# Patient Record
(demographics unavailable — no encounter records)

---

## 2024-12-11 NOTE — REASON FOR VISIT
[Routine Follow-Up] : a routine follow-up visit for [Exercise Induced Dyspnea] : exercise induced dyspnea [Mother] : mother

## 2024-12-12 NOTE — HISTORY OF PRESENT ILLNESS
[Shortness of Breath] : shortness of breath [0 x/month] : 0 x/month [< or = 2 days/wk] : < than or = 2 days/week [0 - 1/year] : 0 - 1/year [> or = 20] : > than or = 20 [Stable] : are stable [Nasal Passage Blockage (Stuffiness)] : nasal congestion [Cough] : coughing [Wheezing] : wheezing [Difficulty Breathing During Exertion] : dyspnea on exertion [Wheezing Only When Breathing In] : stridor [Nasal Discharge From Both Nostrils] : runny nose [Fever] : fever [Sweating Heavily At Night] : night sweats [Nonspecific Pain, Swelling, And Stiffness] : pain [Coughing Up Sputum] : sputum production [Coughing Up Blood (Hemoptysis)] : hemoptysis [Feelings Of Weakness On Exertion] : exercise intolerance [Snoring] : snoring [Exercise] : exercise [(# ___ in the past year)] : hospitalized [unfilled] times in the past year [( # ___ in the past year)] : intubated [unfilled] times in the past year [Minor Limitation] : minor limitation [FreeTextEntry1] : Exercise-induced bronchospasm, intermittent asthma, perennial allergic rhinitis  12/2024 visit. last seen 7/2024 Interval history - No recent exacerbations. Has had nasal congestion for the past few days. No difficulty breathing, minimal coughing. No fevers.  ER/hospitalizations since last visit - none  oral steroids since last visit - denies  cough/wheeze, SOB, night time awakening with cough/wheeze - denies  snoring - has been snoring recently with nasal congestion, does not occur most nights  allergy symptoms - +nasal congestion with dust, has tried children's Zyrtec with some improvement  last used rescue - prior to last appointment in summer    Meds: Albuterol pre-exercise - but has not needed since last visit.  Has not yet received flu vaccine this season   Initial Visit: 7/2024 This is a 7 year old female here for the evaluation of SOB with exertion and prolonged colds.   Age of onset of respiratory sx: toddler- had a nebulizer for colds, wheezing, and congestion. This winter she was waking up at night coughing (sometimes has post tussive emesis) that wakes her up with colds, but had no wheezing at this time. Dx with asthma/RAD: denies  Hospitalization/year: denies  ED visits/year: denies Steroid courses/year: denies  Last oral steroid course: denies  Typical sx: +cough, + shortness of breath, wheezed when she was younger, but never again Typical trigger: + URI/viral, + exercise, allergic trigger, +cold weather Time of year: +winter Response to albuterol: typically yes Response to oral steroids: denies Using spacer: No Spacer technique described as: Interval sx: + exercise intolerance, + nocturnal cough with colds daily cough Atopic sx: +eczema, no seasonal allergies, + environmental allergies (dust), no food allergies GI sx: no reflux sx ENT sx: no chronic congestion, + snoring in winter Family history: asthma atopy- mom, dad and maternal grandfather Current sx: denies    Meds: denies Vaccines: UTD Flu Vaccine: yes COVID Vaccine: denies    Modified asthma predictive index: Parental history of asthma: mom and dad Physician diagnosed atopic dermatitis: denies Environmental Allergies: severely allergic to dust diagnosed by allergist with skin test Peripheral eosinophilia: denies Food allergies: denies     [Oxygen] : the patient uses no supplemental oxygen [More Frequent Use Needed Recently] : Patient reports no recent increase in frequency of [de-identified] : Albuterol PRN

## 2024-12-12 NOTE — END OF VISIT
[FreeTextEntry3] : I, Saida Gong RN, have acted as a scribe and documented the HPI information for Dr. Multani. The HPI documentation completed by the scribe is an accurate record of both my words and actions.  [Time Spent: ___ minutes] : I have spent [unfilled] minutes of time on the encounter which excludes teaching and separately reported services.

## 2024-12-12 NOTE — PHYSICAL EXAM
[Well Nourished] : well nourished [Well Developed] : well developed [Alert] : ~L alert [Active] : active [Normal Breathing Pattern] : normal breathing pattern [No Respiratory Distress] : no respiratory distress [No Allergic Shiners] : no allergic shiners [No Drainage] : no drainage [No Conjunctivitis] : no conjunctivitis [No Nasal Drainage] : no nasal drainage [No Oral Pallor] : no oral pallor [No Oral Cyanosis] : no oral cyanosis [Non-Erythematous] : non-erythematous [No Exudates] : no exudates [No Postnasal Drip] : no postnasal drip [No Tonsillar Enlargement] : no tonsillar enlargement [Absence Of Retractions] : absence of retractions [Symmetric] : symmetric [Good Expansion] : good expansion [No Acc Muscle Use] : no accessory muscle use [Good aeration to bases] : good aeration to bases [Equal Breath Sounds] : equal breath sounds bilaterally [No Crackles] : no crackles [No Rhonchi] : no rhonchi [No Wheezing] : no wheezing [Normal Sinus Rhythm] : normal sinus rhythm [No Heart Murmur] : no heart murmur [Full ROM] : full range of motion [No Clubbing] : no clubbing [Capillary Refill < 2 secs] : capillary refill less than two seconds [No Cyanosis] : no cyanosis [No Petechiae] : no petechiae [Alert and  Oriented] : alert and oriented [No Rashes] : no rashes [No Skin Lesions] : no skin lesions [FreeTextEntry4] : nixon, nasal mucosa

## 2024-12-12 NOTE — REVIEW OF SYSTEMS
[NI] : Genitourinary  [Nl] : Integumentary [Shortness of Breath] : shortness of breath [Eczema] : eczema [Immunizations are up to date] : Immunizations are up to date [Nasal Congestion] : nasal congestion [Frequent URIs] : no frequent upper respiratory infections [Heart Disease] : no heart disease [Wheezing] : no wheezing [Cough] : no cough [Influenza Vaccine this Past Year] : no influenza vaccine this past year

## 2024-12-12 NOTE — CONSULT LETTER
[Dear  ___] : Dear  [unfilled], [Consult Letter:] : I had the pleasure of evaluating your patient, [unfilled]. [Please see my note below.] : Please see my note below. [Consult Closing:] : Thank you very much for allowing me to participate in the care of this patient.  If you have any questions, please do not hesitate to contact me. [Sincerely,] : Sincerely, [FreeTextEntry2] : Dr. Wilber Robert  [FreeTextEntry3] :  Kristen Multani MD Chief, Division of Pediatric Pulmonary and [facility] Center  of Pediatrics [facility] at [facility]

## 2024-12-12 NOTE — CONSULT LETTER
[Dear  ___] : Dear  [unfilled], [Consult Letter:] : I had the pleasure of evaluating your patient, [unfilled]. [Please see my note below.] : Please see my note below. [Consult Closing:] : Thank you very much for allowing me to participate in the care of this patient.  If you have any questions, please do not hesitate to contact me. [Sincerely,] : Sincerely, [FreeTextEntry2] : Dr. Wilber Robert  [FreeTextEntry3] :  Kristen Multani MD Chief, Division of Pediatric Pulmonary and CF Center  of Pediatrics [facility] at [facility]

## 2024-12-12 NOTE — ASSESSMENT
[FreeTextEntry1] : EXERCISE-INDUCED BRONCHOSPASM - Symptoms with exercise are suggestive of exercise induced bronchospasm and are overall improving - will continue to give Albuterol 15-20 min pre-exercise.   INTERMITTENT ASTHMA: Asthma is a clinical diagnosis based on the following predictive risk factors: history of physician diagnosis, dust mite allergy and maternal and paternal history of asthma with a response to bronchodilators. Patient's asthma triggered by activity, and URI. Normal Spirometry today.  ACT >20. Will hold off on maintenance ICS. Overall doing well with last PRN albuterol use prior to last visit in July 2024. History, exam, trajectory or course not supportive of alternative diagnoses such as CF, primary ciliary dyskinesia, immune deficiency, congenital airway anomalies such as airway malacia.  ALLERGIC RHINITIS - Patient has history of dust mite allergies and could use Zyrtec/Claritin with or without intranasal steroids as needed  Plan: Albuterol 2 puffs 15-20 min pre-exercise Use Albuterol 2-4 puffs with spacer 3-4 times per day for cough with viral illness. Consider ICS PRN or daily ICS In the fall/winter if she has frequent viral illnesses or prolonged cough with viral illnesses.   Follow up June-July 2025 continuing to have exercise-induced dyspnea.   Spent 35 minutes reviewing medical record and subspecialist and pediatrician notes. Discussed importance of anti-inflammatory therapy as well as safety and efficacy of ICS if needed. Monitor side effects from bronchodilators.  Discussed use of bronchodilators PRN. Discussed need to continue medications to control rhinitis and decrease inflammation secondary to allergic triggers.

## 2024-12-12 NOTE — HISTORY OF PRESENT ILLNESS
[Shortness of Breath] : shortness of breath [0 x/month] : 0 x/month [< or = 2 days/wk] : < than or = 2 days/week [0 - 1/year] : 0 - 1/year [> or = 20] : > than or = 20 [Stable] : are stable [Nasal Passage Blockage (Stuffiness)] : nasal congestion [Cough] : coughing [Wheezing] : wheezing [Difficulty Breathing During Exertion] : dyspnea on exertion [Wheezing Only When Breathing In] : stridor [Nasal Discharge From Both Nostrils] : runny nose [Fever] : fever [Sweating Heavily At Night] : night sweats [Nonspecific Pain, Swelling, And Stiffness] : pain [Coughing Up Sputum] : sputum production [Coughing Up Blood (Hemoptysis)] : hemoptysis [Feelings Of Weakness On Exertion] : exercise intolerance [Snoring] : snoring [Exercise] : exercise [(# ___ in the past year)] : hospitalized [unfilled] times in the past year [( # ___ in the past year)] : intubated [unfilled] times in the past year [Minor Limitation] : minor limitation [FreeTextEntry1] : Exercise-induced bronchospasm, intermittent asthma, perennial allergic rhinitis  12/2024 visit. last seen 7/2024 Interval history - No recent exacerbations. Has had nasal congestion for the past few days. No difficulty breathing, minimal coughing. No fevers.  ER/hospitalizations since last visit - none  oral steroids since last visit - denies  cough/wheeze, SOB, night time awakening with cough/wheeze - denies  snoring - has been snoring recently with nasal congestion, does not occur most nights  allergy symptoms - +nasal congestion with dust, has tried children's Zyrtec with some improvement  last used rescue - prior to last appointment in summer    Meds: Albuterol pre-exercise - but has not needed since last visit.  Has not yet received flu vaccine this season   Initial Visit: 7/2024 This is a 7 year old female here for the evaluation of SOB with exertion and prolonged colds.   Age of onset of respiratory sx: toddler- had a nebulizer for colds, wheezing, and congestion. This winter she was waking up at night coughing (sometimes has post tussive emesis) that wakes her up with colds, but had no wheezing at this time. Dx with asthma/RAD: denies  Hospitalization/year: denies  ED visits/year: denies Steroid courses/year: denies  Last oral steroid course: denies  Typical sx: +cough, + shortness of breath, wheezed when she was younger, but never again Typical trigger: + URI/viral, + exercise, allergic trigger, +cold weather Time of year: +winter Response to albuterol: typically yes Response to oral steroids: denies Using spacer: No Spacer technique described as: Interval sx: + exercise intolerance, + nocturnal cough with colds daily cough Atopic sx: +eczema, no seasonal allergies, + environmental allergies (dust), no food allergies GI sx: no reflux sx ENT sx: no chronic congestion, + snoring in winter Family history: asthma atopy- mom, dad and maternal grandfather Current sx: denies    Meds: denies Vaccines: UTD Flu Vaccine: yes COVID Vaccine: denies    Modified asthma predictive index: Parental history of asthma: mom and dad Physician diagnosed atopic dermatitis: denies Environmental Allergies: severely allergic to dust diagnosed by allergist with skin test Peripheral eosinophilia: denies Food allergies: denies     [Oxygen] : the patient uses no supplemental oxygen [More Frequent Use Needed Recently] : Patient reports no recent increase in frequency of [de-identified] : Albuterol PRN

## 2024-12-12 NOTE — IMPRESSION
[Pulmonary Function Test] : Pulmonary Function Test [Normal PFT] : pulmonary function test normal  [Normal Spirometry] : spirometry normal [FreeTextEntry1] : % pred, FEV1 113% pred, FEV1/FVC 89%, FEF 25-75 91% pred

## 2025-06-19 NOTE — CONSULT LETTER
[FreeTextEntry2] : Dr. Wilber Robert  [FreeTextEntry3] :  Kristen Multani MD Chief, Division of Pediatric Pulmonary and CF Center  of Pediatrics [facility] at [facility]

## 2025-06-19 NOTE — REVIEW OF SYSTEMS
[Frequent URIs] : no frequent upper respiratory infections [Heart Disease] : no heart disease [Wheezing] : no wheezing [Cough] : no cough [Influenza Vaccine this Past Year] : no influenza vaccine this past year

## 2025-06-19 NOTE — HISTORY OF PRESENT ILLNESS
[FreeTextEntry1] : Exercise-induced bronchospasm, intermittent asthma, perennial allergic rhinitis  6/2025 visit. Last seen 12/2024.   Interval Hx: Doing well. Recent ER visits/hospitalizations: Last oral steroid course: Cough, SOB, or wheeze/ nighttime awakening with cough/wheeze: Daily meds: Albuterol PRN and before exercise Last used rescue: Allergic rhinitis symptoms:   Flu vaccine: COVID 19 vaccine: COVID exposure:  12/2024 visit. last seen 7/2024 Interval history - No recent exacerbations. Has had nasal congestion for the past few days. No difficulty breathing, minimal coughing. No fevers.  ER/hospitalizations since last visit - none  oral steroids since last visit - denies  cough/wheeze, SOB, night time awakening with cough/wheeze - denies  snoring - has been snoring recently with nasal congestion, does not occur most nights  allergy symptoms - +nasal congestion with dust, has tried children's Zyrtec with some improvement  last used rescue - prior to last appointment in summer    Meds: Albuterol pre-exercise - but has not needed since last visit.  Has not yet received flu vaccine this season   Initial Visit: 7/2024 This is a 7 year old female here for the evaluation of SOB with exertion and prolonged colds.   Age of onset of respiratory sx: toddler- had a nebulizer for colds, wheezing, and congestion. This winter she was waking up at night coughing (sometimes has post tussive emesis) that wakes her up with colds, but had no wheezing at this time. Dx with asthma/RAD: denies  Hospitalization/year: denies  ED visits/year: denies Steroid courses/year: denies  Last oral steroid course: denies  Typical sx: +cough, + shortness of breath, wheezed when she was younger, but never again Typical trigger: + URI/viral, + exercise, allergic trigger, +cold weather Time of year: +winter Response to albuterol: typically yes Response to oral steroids: denies Using spacer: No Spacer technique described as: Interval sx: + exercise intolerance, + nocturnal cough with colds daily cough Atopic sx: +eczema, no seasonal allergies, + environmental allergies (dust), no food allergies GI sx: no reflux sx ENT sx: no chronic congestion, + snoring in winter Family history: asthma atopy- mom, dad and maternal grandfather Current sx: denies    Meds: denies Vaccines: UTD Flu Vaccine: yes COVID Vaccine: denies    Modified asthma predictive index: Parental history of asthma: mom and dad Physician diagnosed atopic dermatitis: denies Environmental Allergies: severely allergic to dust diagnosed by allergist with skin test Peripheral eosinophilia: denies Food allergies: denies        [Oxygen] : the patient uses no supplemental oxygen [More Frequent Use Needed Recently] : Patient reports no recent increase in frequency of [de-identified] : Albuterol PRN

## 2025-06-19 NOTE — CONSULT LETTER
[FreeTextEntry2] : Dr. Wilber Robert  [FreeTextEntry3] :  Kristen Multani MD Chief, Division of Pediatric Pulmonary and CF Center  of Pediatrics [facility] at [facility]

## 2025-06-19 NOTE — HISTORY OF PRESENT ILLNESS
[FreeTextEntry1] : Exercise-induced bronchospasm, intermittent asthma, perennial allergic rhinitis  6/2025 visit. Last seen 12/2024.   Interval Hx: Doing well. Recent ER visits/hospitalizations: Last oral steroid course: Cough, SOB, or wheeze/ nighttime awakening with cough/wheeze: Daily meds: Albuterol PRN and before exercise Last used rescue: Allergic rhinitis symptoms:   Flu vaccine: COVID 19 vaccine: COVID exposure:  12/2024 visit. last seen 7/2024 Interval history - No recent exacerbations. Has had nasal congestion for the past few days. No difficulty breathing, minimal coughing. No fevers.  ER/hospitalizations since last visit - none  oral steroids since last visit - denies  cough/wheeze, SOB, night time awakening with cough/wheeze - denies  snoring - has been snoring recently with nasal congestion, does not occur most nights  allergy symptoms - +nasal congestion with dust, has tried children's Zyrtec with some improvement  last used rescue - prior to last appointment in summer    Meds: Albuterol pre-exercise - but has not needed since last visit.  Has not yet received flu vaccine this season   Initial Visit: 7/2024 This is a 7 year old female here for the evaluation of SOB with exertion and prolonged colds.   Age of onset of respiratory sx: toddler- had a nebulizer for colds, wheezing, and congestion. This winter she was waking up at night coughing (sometimes has post tussive emesis) that wakes her up with colds, but had no wheezing at this time. Dx with asthma/RAD: denies  Hospitalization/year: denies  ED visits/year: denies Steroid courses/year: denies  Last oral steroid course: denies  Typical sx: +cough, + shortness of breath, wheezed when she was younger, but never again Typical trigger: + URI/viral, + exercise, allergic trigger, +cold weather Time of year: +winter Response to albuterol: typically yes Response to oral steroids: denies Using spacer: No Spacer technique described as: Interval sx: + exercise intolerance, + nocturnal cough with colds daily cough Atopic sx: +eczema, no seasonal allergies, + environmental allergies (dust), no food allergies GI sx: no reflux sx ENT sx: no chronic congestion, + snoring in winter Family history: asthma atopy- mom, dad and maternal grandfather Current sx: denies    Meds: denies Vaccines: UTD Flu Vaccine: yes COVID Vaccine: denies    Modified asthma predictive index: Parental history of asthma: mom and dad Physician diagnosed atopic dermatitis: denies Environmental Allergies: severely allergic to dust diagnosed by allergist with skin test Peripheral eosinophilia: denies Food allergies: denies        [Oxygen] : the patient uses no supplemental oxygen [More Frequent Use Needed Recently] : Patient reports no recent increase in frequency of [de-identified] : Albuterol PRN